# Patient Record
Sex: FEMALE | Race: WHITE | NOT HISPANIC OR LATINO | URBAN - METROPOLITAN AREA
[De-identification: names, ages, dates, MRNs, and addresses within clinical notes are randomized per-mention and may not be internally consistent; named-entity substitution may affect disease eponyms.]

---

## 2022-06-06 ENCOUNTER — TELEPHONE ENCOUNTER (OUTPATIENT)
Dept: URBAN - METROPOLITAN AREA CLINIC 92 | Facility: CLINIC | Age: 82
End: 2022-06-06

## 2022-06-07 ENCOUNTER — TELEPHONE ENCOUNTER (OUTPATIENT)
Dept: URBAN - METROPOLITAN AREA CLINIC 12 | Facility: CLINIC | Age: 82
End: 2022-06-07

## 2022-06-07 ENCOUNTER — WEB ENCOUNTER (OUTPATIENT)
Dept: URBAN - NONMETROPOLITAN AREA CLINIC 13 | Facility: CLINIC | Age: 82
End: 2022-06-07

## 2022-06-07 ENCOUNTER — TELEPHONE ENCOUNTER (OUTPATIENT)
Dept: URBAN - NONMETROPOLITAN AREA CLINIC 2 | Facility: CLINIC | Age: 82
End: 2022-06-07

## 2022-06-07 ENCOUNTER — LAB OUTSIDE AN ENCOUNTER (OUTPATIENT)
Dept: URBAN - NONMETROPOLITAN AREA CLINIC 13 | Facility: CLINIC | Age: 82
End: 2022-06-07

## 2022-06-07 ENCOUNTER — OFFICE VISIT (OUTPATIENT)
Dept: URBAN - NONMETROPOLITAN AREA CLINIC 13 | Facility: CLINIC | Age: 82
End: 2022-06-07
Payer: MEDICARE

## 2022-06-07 ENCOUNTER — DASHBOARD ENCOUNTERS (OUTPATIENT)
Age: 82
End: 2022-06-07

## 2022-06-07 VITALS
HEIGHT: 66 IN | BODY MASS INDEX: 22.14 KG/M2 | DIASTOLIC BLOOD PRESSURE: 69 MMHG | TEMPERATURE: 97.3 F | HEART RATE: 78 BPM | SYSTOLIC BLOOD PRESSURE: 137 MMHG | WEIGHT: 137.8 LBS

## 2022-06-07 DIAGNOSIS — R07.89 NON-CARDIAC CHEST PAIN: ICD-10-CM

## 2022-06-07 DIAGNOSIS — R13.19 ESOPHAGEAL DYSPHAGIA: ICD-10-CM

## 2022-06-07 DIAGNOSIS — R93.5 ABNORMAL US (ULTRASOUND) OF ABDOMEN: ICD-10-CM

## 2022-06-07 PROCEDURE — 99204 OFFICE O/P NEW MOD 45 MIN: CPT | Performed by: NURSE PRACTITIONER

## 2022-06-07 RX ORDER — OMEPRAZOLE 20 MG/1
1 CAPSULE 30 MINUTES BEFORE MEALS CAPSULE, DELAYED RELEASE ORAL TWICE A DAY
Qty: 180 | Refills: 3 | OUTPATIENT
Start: 2022-06-07

## 2022-06-07 RX ORDER — HYDROCHLOROTHIAZIDE 25 MG/1
TAKE ONE-HALF TABLET BY MOUTH ONE TIME DAILY TABLET ORAL
Qty: 45 | Refills: 0 | Status: ACTIVE | COMMUNITY

## 2022-06-07 RX ORDER — HYOSCYAMINE SULFATE 0.12 MG/1
1 TABLET UNDER THE TONGUE AND ALLOW TO DISSOLVE EVERY 4-6 HRS  AS NEEDED FOR ESOPHAGEAL SPASM TABLET, ORALLY DISINTEGRATING ORAL
Qty: 90 | Refills: 3 | OUTPATIENT
Start: 2022-06-07 | End: 2022-10-05

## 2022-06-07 RX ORDER — SPIRONOLACTONE 50 MG/1
TAKE ONE TABLET BY MOUTH ONE TIME DAILY TABLET ORAL
Qty: 90 | Refills: 0 | Status: ACTIVE | COMMUNITY

## 2022-06-07 RX ORDER — APIXABAN 5 MG/1
TAKE ONE TABLET BY MOUTH TWICE A DAY TABLET, FILM COATED ORAL
Qty: 60 | Refills: 6 | Status: ACTIVE | COMMUNITY

## 2022-06-07 RX ORDER — ZOLPIDEM TARTRATE 10 MG/1
TAKE ONE TABLET BY MOUTH AT BEDTIME AS NEEDED TABLET, FILM COATED ORAL
Qty: 90 | Refills: 0 | Status: ACTIVE | COMMUNITY

## 2022-06-07 RX ORDER — FLUOXETINE 20 MG/1
TAKE ONE CAPSULE BY MOUTH ONE TIME DAILY CAPSULE ORAL
Qty: 90 | Refills: 1 | Status: ACTIVE | COMMUNITY

## 2022-06-07 RX ORDER — ALPRAZOLAM 0.5 MG/1
TABLET ORAL
Qty: 30 | Status: ACTIVE | COMMUNITY

## 2022-06-07 RX ORDER — METOPROLOL SUCCINATE 100 MG/1
TAKE ONE TABLET BY MOUTH ONE TIME DAILY TABLET, EXTENDED RELEASE ORAL
Qty: 90 | Refills: 1 | Status: ACTIVE | COMMUNITY

## 2022-06-07 NOTE — HPI-TODAY'S VISIT:
6/7/2022 Ms. Deidra Hogue is a 81 year old female here for epigastric pain and dysphagia. She ate chicken and after two bites she felt it get stuck in her lower esophagus. She could not handle her secretions and could not get the food bolus to move. She went to [facility]. She was given glucagon and her symptoms improved. She had an US and CT scan with no acute findings. She was given maalox and viscous lidocaine with resolution of her symptoms. Today, she continues to feel sore. She is scared food will get stuck again. She denies any return of pain or dysphagia. She denies any reflux. She denies any previous episodes of this. CS

## 2022-06-28 ENCOUNTER — TELEPHONE ENCOUNTER (OUTPATIENT)
Dept: URBAN - METROPOLITAN AREA CLINIC 92 | Facility: CLINIC | Age: 82
End: 2022-06-28

## 2022-07-12 ENCOUNTER — OFFICE VISIT (OUTPATIENT)
Dept: URBAN - NONMETROPOLITAN AREA SURGERY CENTER 1 | Facility: SURGERY CENTER | Age: 82
End: 2022-07-12

## 2022-09-07 ENCOUNTER — LAB OUTSIDE AN ENCOUNTER (OUTPATIENT)
Dept: URBAN - NONMETROPOLITAN AREA CLINIC 13 | Facility: CLINIC | Age: 82
End: 2022-09-07

## 2022-09-26 ENCOUNTER — CLAIMS CREATED FROM THE CLAIM WINDOW (OUTPATIENT)
Dept: URBAN - METROPOLITAN AREA CLINIC 4 | Facility: CLINIC | Age: 82
End: 2022-09-26
Payer: MEDICARE

## 2022-09-26 ENCOUNTER — OFFICE VISIT (OUTPATIENT)
Dept: URBAN - NONMETROPOLITAN AREA SURGERY CENTER 1 | Facility: SURGERY CENTER | Age: 82
End: 2022-09-26
Payer: MEDICARE

## 2022-09-26 DIAGNOSIS — R13.19 CERVICAL DYSPHAGIA: ICD-10-CM

## 2022-09-26 DIAGNOSIS — K21.9 GASTRO-ESOPHAGEAL REFLUX DISEASE WITHOUT ESOPHAGITIS: ICD-10-CM

## 2022-09-26 DIAGNOSIS — K21.9 ACID REFLUX: ICD-10-CM

## 2022-09-26 PROCEDURE — 43239 EGD BIOPSY SINGLE/MULTIPLE: CPT | Performed by: INTERNAL MEDICINE

## 2022-09-26 PROCEDURE — G8907 PT DOC NO EVENTS ON DISCHARG: HCPCS | Performed by: INTERNAL MEDICINE

## 2022-09-26 PROCEDURE — 88305 TISSUE EXAM BY PATHOLOGIST: CPT | Performed by: PATHOLOGY
